# Patient Record
Sex: MALE | Race: WHITE | NOT HISPANIC OR LATINO | URBAN - METROPOLITAN AREA
[De-identification: names, ages, dates, MRNs, and addresses within clinical notes are randomized per-mention and may not be internally consistent; named-entity substitution may affect disease eponyms.]

---

## 2020-07-23 ENCOUNTER — EMERGENCY (EMERGENCY)
Facility: HOSPITAL | Age: 60
LOS: 0 days | Discharge: HOME | End: 2020-07-23
Attending: STUDENT IN AN ORGANIZED HEALTH CARE EDUCATION/TRAINING PROGRAM | Admitting: STUDENT IN AN ORGANIZED HEALTH CARE EDUCATION/TRAINING PROGRAM
Payer: SELF-PAY

## 2020-07-23 VITALS
SYSTOLIC BLOOD PRESSURE: 155 MMHG | TEMPERATURE: 98 F | HEART RATE: 94 BPM | DIASTOLIC BLOOD PRESSURE: 91 MMHG | RESPIRATION RATE: 18 BRPM | OXYGEN SATURATION: 96 %

## 2020-07-23 VITALS
HEART RATE: 78 BPM | OXYGEN SATURATION: 98 % | SYSTOLIC BLOOD PRESSURE: 127 MMHG | DIASTOLIC BLOOD PRESSURE: 62 MMHG | RESPIRATION RATE: 20 BRPM | TEMPERATURE: 97 F

## 2020-07-23 DIAGNOSIS — M79.643 PAIN IN UNSPECIFIED HAND: ICD-10-CM

## 2020-07-23 DIAGNOSIS — I63.9 CEREBRAL INFARCTION, UNSPECIFIED: ICD-10-CM

## 2020-07-23 DIAGNOSIS — F17.200 NICOTINE DEPENDENCE, UNSPECIFIED, UNCOMPLICATED: ICD-10-CM

## 2020-07-23 LAB
ALBUMIN SERPL ELPH-MCNC: 4.4 G/DL — SIGNIFICANT CHANGE UP (ref 3.5–5.2)
ALP SERPL-CCNC: 101 U/L — SIGNIFICANT CHANGE UP (ref 30–115)
ALT FLD-CCNC: 59 U/L — HIGH (ref 0–41)
ANION GAP SERPL CALC-SCNC: 20 MMOL/L — HIGH (ref 7–14)
AST SERPL-CCNC: 50 U/L — HIGH (ref 0–41)
BILIRUB SERPL-MCNC: 0.3 MG/DL — SIGNIFICANT CHANGE UP (ref 0.2–1.2)
BUN SERPL-MCNC: 13 MG/DL — SIGNIFICANT CHANGE UP (ref 10–20)
CALCIUM SERPL-MCNC: 9.6 MG/DL — SIGNIFICANT CHANGE UP (ref 8.5–10.1)
CHLORIDE SERPL-SCNC: 98 MMOL/L — SIGNIFICANT CHANGE UP (ref 98–110)
CO2 SERPL-SCNC: 20 MMOL/L — SIGNIFICANT CHANGE UP (ref 17–32)
CREAT SERPL-MCNC: 0.8 MG/DL — SIGNIFICANT CHANGE UP (ref 0.7–1.5)
GLUCOSE SERPL-MCNC: 395 MG/DL — HIGH (ref 70–99)
HCT VFR BLD CALC: 42.2 % — SIGNIFICANT CHANGE UP (ref 42–52)
HGB BLD-MCNC: 14.6 G/DL — SIGNIFICANT CHANGE UP (ref 14–18)
MCHC RBC-ENTMCNC: 30.2 PG — SIGNIFICANT CHANGE UP (ref 27–31)
MCHC RBC-ENTMCNC: 34.6 G/DL — SIGNIFICANT CHANGE UP (ref 32–37)
MCV RBC AUTO: 87.4 FL — SIGNIFICANT CHANGE UP (ref 80–94)
NRBC # BLD: 0 /100 WBCS — SIGNIFICANT CHANGE UP (ref 0–0)
PLATELET # BLD AUTO: 179 K/UL — SIGNIFICANT CHANGE UP (ref 130–400)
POTASSIUM SERPL-MCNC: 4 MMOL/L — SIGNIFICANT CHANGE UP (ref 3.5–5)
POTASSIUM SERPL-SCNC: 4 MMOL/L — SIGNIFICANT CHANGE UP (ref 3.5–5)
PROT SERPL-MCNC: 7 G/DL — SIGNIFICANT CHANGE UP (ref 6–8)
RBC # BLD: 4.83 M/UL — SIGNIFICANT CHANGE UP (ref 4.7–6.1)
RBC # FLD: 11.6 % — SIGNIFICANT CHANGE UP (ref 11.5–14.5)
SODIUM SERPL-SCNC: 138 MMOL/L — SIGNIFICANT CHANGE UP (ref 135–146)
WBC # BLD: 4.99 K/UL — SIGNIFICANT CHANGE UP (ref 4.8–10.8)
WBC # FLD AUTO: 4.99 K/UL — SIGNIFICANT CHANGE UP (ref 4.8–10.8)

## 2020-07-23 PROCEDURE — 73130 X-RAY EXAM OF HAND: CPT | Mod: 26,RT

## 2020-07-23 PROCEDURE — 70450 CT HEAD/BRAIN W/O DYE: CPT | Mod: 26

## 2020-07-23 PROCEDURE — 99285 EMERGENCY DEPT VISIT HI MDM: CPT

## 2020-07-23 PROCEDURE — 99282 EMERGENCY DEPT VISIT SF MDM: CPT

## 2020-07-23 PROCEDURE — 93010 ELECTROCARDIOGRAM REPORT: CPT

## 2020-07-23 PROCEDURE — 73110 X-RAY EXAM OF WRIST: CPT | Mod: 26,RT

## 2020-07-23 NOTE — ED PROVIDER NOTE - PROGRESS NOTE DETAILS
spoke to Dr. Donato neurology, will call [facility] to see pt and discuss with the stroke team pt seen by neurology, no stroke unit, admit tele Discussed plan for admission with patient. Patient refusing admission, stating he would rather go home. Explained to patient the urgent nature of his disease and that he may deteriorate further, stroke symptoms may worsen or he may die without further intervention or treatment. Patient voiced understanding, but states he would rather go home because he is more comfortable there. The patient wishes to leave against medical advice.  I have discussed the risks, benefits and alternatives (including the possibility of worsening of disease, pain, permanent disability, and/or death) with the patient and his/her family (if available).  The patient voices understanding of these risks, benefits, and alternatives and still wishes to sign out against medical advice.  The patient is awake, alert, oriented  x 3 and has demonstrated capacity to refuse/direct care.  I have advised the patient that they can and should return immediately should they develop any worse/different/additional symptoms, or if they change their mind and want to continue their care.

## 2020-07-23 NOTE — CONSULT NOTE ADULT - ASSESSMENT
13. Impression:   59 year old gentleman male with a PMH of DM and alcohol dependence presented to the ER with 4 days of weakness.  CT head revealed new age-indeterminate lacunar infarct involving the left corona.    14. Probable cause/s of Stroke:  Unknown, more likely related to  but will have a better understanding post MRI brain.     15. Suggestions:   Routine stroke workup including:  MRI brain without esther  MRA head and neck  TTE  LDL A1C  Telemetry monitoring   PT OT Rehab     16. Disposition:  Telemetry floor    Elian Ortega NP  [phone]

## 2020-07-23 NOTE — ED PROVIDER NOTE - ENMT, MLM
Head NC/AT, neck supple, Airway patent, Nasal mucosa clear. Mouth with normal mucosa. Throat has no vesicles, no oropharyngeal exudates and uvula is midline.

## 2020-07-23 NOTE — ED PROVIDER NOTE - NEUROLOGICAL, MLM
Alert and oriented, no focal deficits, no sensory deficits. (+) median nerve motor deficit Right hand.  DTR's 2+ bilaterally.  Gait normal.

## 2020-07-23 NOTE — ED PROVIDER NOTE - NSFOLLOWUPINSTRUCTIONS_ED_ALL_ED_FT
Ischemic Stroke     An ischemic stroke (cerebrovascular accident, or CVA) is the sudden death of brain tissue that occurs when an area of the brain does not get enough oxygen. It is a medical emergency that must be treated right away. An ischemic stroke can cause permanent loss of brain function. This can cause problems with how different parts of your body function.  What are the causes?  This condition is caused by a decrease of oxygen supply to an area of the brain, which may be the result of:  A small blood clot (embolus) or a buildup of plaque in the blood vessels (atherosclerosis) that blocks blood flow in the brain.An abnormal heart rhythm (atrial fibrillation).A blocked or damaged artery in the head or neck.Sometimes the cause of stroke is not known (cryptogenic).  What increases the risk?  Certain factors may make you more likely to develop this condition. Some of these factors are things that you can change, such as:  Obesity.Smoking cigarettes.Taking oral birth control, especially if you also use tobacco.Physical inactivity.Excessive alcohol use.Use of illegal drugs, especially cocaine and methamphetamine.Other risk factors include:  High blood pressure (hypertension).High cholesterol.Diabetes mellitus.Heart disease.Being , , , or .Being over age 60.Family history of stroke.Previous history of blood clots, stroke, or transient ischemic attack (TIA).Sickle cell disease.Being a woman with a history of preeclampsia.Migraine headache.Sleep apnea.Irregular heartbeats, such as atrial fibrillation.Chronic inflammatory diseases, such as rheumatoid arthritis or lupus.Blood clotting disorders (hypercoagulable state).What are the signs or symptoms?  Symptoms of this condition usually develop suddenly, or you may notice them after waking up from sleep. Symptoms may include sudden:  Weakness or numbness in your face, arm, or leg, especially on one side of your body.Trouble walking or difficulty moving your arms or legs.Loss of balance or coordination.Confusion.Slurred speech (dysarthria).Trouble speaking, understanding speech, or both (aphasia).Vision changes—such as double vision, blurred vision, or loss of vision—in one or both eyes.Dizziness.Nausea and vomiting.Severe headache with no known cause. The headache is often described as the worst headache ever experienced.If possible, make note of the exact time that you last felt like your normal self and what time your symptoms started. Tell your health care provider.  If symptoms come and go, this could be a sign of a warning stroke, or TIA. Get help right away, even if you feel better.  How is this diagnosed?  This condition may be diagnosed based on:  Your symptoms, your medical history, and a physical exam.CT scan of the brain.MRI.CT angiogram. This test uses a computer to take X-rays of your arteries. A dye may be injected into your blood to show the inside of your blood vessels more clearly.MRI angiogram. This is a type of MRI that is used to evaluate the blood vessels.Cerebral angiogram. This test uses X-rays and a dye to show the blood vessels in the brain and neck.You may need to see a health care provider who specializes in stroke care. A stroke specialist can be seen in person or through communication using telephone or television technology (telemedicine).  Other tests may also be done to find the cause of the stroke, such as:  Electrocardiogram (ECG).Continuous heart monitoring.Echocardiogram.Transesophageal echocardiogram (ELLY).Carotid ultrasound.A scan of the brain circulation.Blood tests.Sleep study to check for sleep apnea.How is this treated?  Treatment for this condition will depend on the duration, severity, and cause of your symptoms and on the area of the brain affected. It is very important to get treatment at the first sign of stroke symptoms. Some treatments work better if they are done within 3–6 hours of the onset of stroke symptoms. These initial treatments may include:  Aspirin.Medicines to control blood pressure.Medicine given by injection to dissolve the blood clot (thrombolytic).Treatments given directly to the affected artery to remove or dissolve the blood clot.Other treatment options may include:  Oxygen.IV fluids.Medicines to thin the blood (anticoagulants or antiplatelets).Procedures to increase blood flow.Medicines and changes to your diet may be used to help treat and manage risk factors for stroke, such as diabetes, high cholesterol, and high blood pressure.  After a stroke, you may work with physical, speech, mental health, or occupational therapists to help you recover.  Follow these instructions at home:  Medicines     Take over-the-counter and prescription medicines only as told by your health care provider.If you were told to take a medicine to thin your blood, such as aspirin or an anticoagulant, take it exactly as told by your health care provider.  Taking too much blood-thinning medicine can cause bleeding.If you do not take enough blood-thinning medicine, you will not have the protection that you need against another stroke and other problems.Understand the side effects of taking anticoagulant medicine. When taking this type of medicine, make sure you:  Hold pressure over any cuts for longer than usual.Tell your dentist and other health care providers that you are taking anticoagulants before you have any procedures that may cause bleeding.Avoid activities that may cause trauma or injury.Eating and drinking     Follow instructions from your health care provider about diet.Eat healthy foods.If your ability to swallow was affected by the stroke, you may need to take steps to avoid choking, such as:  Taking small bites when eating.Eating foods that are soft or pureed.Safety     Follow instructions from your health care team about physical activity.Use a walker or cane as told by your health care provider.Take steps to create a safe home environment in order to reduce the risk of falls. This may include:  Having your home looked at by specialists.Installing grab bars in the bedroom and bathroom.Using safety equipment, such as raised toilets and a seat in the shower.General instructions     Do not use any tobacco products, such as cigarettes, chewing tobacco, and e-cigarettes. If you need help quitting, ask your health care provider.Limit alcohol intake to no more than 1 drink a day for nonpregnant women and 2 drinks a day for men. One drink equals 12 oz of beer, 5 oz of wine, or 1½ oz of hard liquor.If you need help to stop using drugs or alcohol, ask your health care provider about a referral to a program or specialist.Maintain an active and healthy lifestyle. Get regular exercise as told by your health care provider.Keep all follow-up visits as told by your health care provider, including visits with all specialists on your health care team. This is important.How is this prevented?  Your risk of another stroke can be decreased by managing high blood pressure, high cholesterol, diabetes, heart disease, sleep apnea, and obesity. It can also be decreased by quitting smoking, limiting alcohol, and staying physically active.  Your health care provider will continue to work with you on measures to prevent short-term and long-term complications of stroke.  Get help right away if:     You have any symptoms of a stroke. "BE FAST" is an easy way to remember the main warning signs of a stroke:  B - Balance. Signs are dizziness, sudden trouble walking, or loss of balance.E - Eyes. Signs are trouble seeing or a sudden change in vision.F - Face. Signs are sudden weakness or numbness of the face, or the face or eyelid drooping on one side.A - Arms. Signs are weakness or numbness in an arm. This happens suddenly and usually on one side of the body.S - Speech. Signs are sudden trouble speaking, slurred speech, or trouble understanding what people say.T - Time. Time to call emergency services. Write down what time symptoms started.You have other signs of a stroke, such as:  A sudden, severe headache with no known cause.Nausea or vomiting.Seizure.These symptoms may represent a serious problem that is an emergency. Do not wait to see if the symptoms will go away. Get medical help right away. Call your local emergency services (911 in the [place] Do not drive yourself to the hospital. Summary  An ischemic stroke (cerebrovascular accident, or CVA) is the sudden death of brain tissue that occurs when an area of the brain does not get enough oxygen.Symptoms of this condition usually develop suddenly, or you may notice them after waking up from sleep.It is very important to get treatment at the first sign of stroke symptoms. Stroke is a medical emergency that must be treated right away.This information is not intended to replace advice given to you by your health care provider. Make sure you discuss any questions you have with your health care provider.    Document Released: 12/18/2006 Document Revised: 09/06/2019 Document Reviewed: 03/15/2017  [employer] Patient Education © 2020 [employer]

## 2020-07-23 NOTE — CONSULT NOTE ADULT - SUBJECTIVE AND OBJECTIVE BOX
Stroke Progress Note:    NIRAJ ORTEZ    1. Chief Complaint: Right hand weakness    HPI:   59 year old gentleman male with a PMH of DM and alcohol dependence presented to the ER with 4 days of weakness.    2. Relevant PMH:   Prior ischemic stroke/TIA[ ], Afib [ ], CAD [ ], HTN [ ], DLD [ ], DM [ ], PVD [ ], Obesity [ ],   Sedentary lifestyle [ ], CHF [ ], CORRINA [ ], Cancer Hx [ ].    3. Social History: Smoking [ ], Drug Use [ ], Alcohol Use [ ], Other [ ]    4. Possible Location of Stroke:  see below  5. Relevant Brain Tissue Imaging:  < from: CT Head No Cont (07.23.20 @ 12:50) >  IMPRESSION:  New age-indeterminate lacunar infarct involving the left corona radiatasince MRI and CT of the brain of 2010.    Chronic lacunar infarct involving the left thalamus, previously seen on MRI of the brain of 5/9/2010.    No evidence of intracranial hemorrhage, mass effect or midline shift.      < end of copied text >    6. Relevant Cerebrovascular Imaging:          pending      7. Relevant blood tests:     pending  8. Relevant cardiac rhythm monitoring:   pending  9. Relevant Cardiac Structure: (TTE/ELLY +/-):[ ]No intracardiac thrombus/[ ] no vegetation/[ ]no [place]ia/EF:   pending  Home Medications:      MEDICATIONS  (STANDING):      10. PT/OT/Speech/Rehab/S&Sw/ Cognitive eval results and recommendations:   pending  11. Exam:    Vital Signs Last 24 Hrs  T(C): 36.1 (23 Jul 2020 11:10), Max: 36.1 (23 Jul 2020 11:10)  T(F): 97 (23 Jul 2020 11:10), Max: 97 (23 Jul 2020 11:10)  HR: 78 (23 Jul 2020 11:10) (78 - 78)  BP: 127/62 (23 Jul 2020 11:10) (127/62 - 127/62)  BP(mean): --  RR: 20 (23 Jul 2020 11:10) (20 - 20)  SpO2: 98% (23 Jul 2020 11:10) (98% - 98%)    12.   NIH STROKE SCALE  Item	                                                        Score  1 a.	Level of Consciousness	               	0  1 b. LOC Questions	                                0  1 c.	LOC Commands	                               	0  2.	Best Gaze	                                        0  3.	Visual	                                                0  4.	Facial Palsy	                                        0  5 a.	Motor Arm - Left	                                0  5 b.	Motor Arm - Right	                        1  6 a.	Motor Leg - Left	                                0  6 b.	Motor Leg - Right	                                0  7.	Limb Ataxia	                                        0  8.	Sensory	                                                0  9.	Language	                                        0  10.	Dysarthria	                                        0  11.	Extinction and Inattention  	        0  ______________________________________  TOTAL	                                                     1          NIHSS today: 1    mRS:1  0 No symptoms at all  1 No significant disability despite symptoms; able to carry out all usual duties and activities without assistance  2 Slight disability; unable to carry out all previous activities, but able to look after own affairs  3 Moderate disability; requiring some help, but able to walk without assistance  4 Moderately severe disability; unable to walk without assistance and unable to attend to own bodily needs without assistance  5 Severe disability; bedridden, incontinent and requiring constant nursing care and attention  6 Dead

## 2020-07-23 NOTE — ED PROVIDER NOTE - ATTENDING CONTRIBUTION TO CARE
60 yo M pmh chronic etoh use pw hand weakness. R hand weakness x4 days. Difficulty gripping objects. No trauma, no inciting incident, no fever/chills, no n/v, no vision change, no hearing change, no back pain, no cp, no jaw pain, no sob, no abd pain.     CONSTITUTIONAL: Well-developed; well-nourished; in no acute distress. Sitting up and providing appropriate history and physical examination  SKIN: skin exam is warm and dry, no acute rash.  HEAD: Normocephalic; atraumatic.  EYES: PERRL, 3 mm bilateral, no nystagmus, EOM intact; conjunctiva and sclera clear.  ENT: No nasal discharge; airway clear.  NECK: Supple; non tender. + full passive ROM in all directions. No JVD  CARD: S1, S2 normal; no murmurs, gallops, or rubs. Regular rate and rhythm. + Symmetric Strong Pulses  RESP: No wheezes, rales or rhonchi. Good air movement bilaterally  ABD: soft; non-distended; non-tender. No Rebound, No Guarding, No signs of peritonitis, No CVA tenderness. No pulsatile abdominal mass. + Strong and Symmetric Pulses  EXT: Normal ROM. No clubbing, cyanosis or edema. Dp and Pt Pulses intact. Cap refill less than 3 seconds  NEURO: +R hand pronator drift. CN 2-12 intact, normal finger to nose, stable gait, no sensory or motor deficits, Alert, oriented, grossly unremarkable. GCS 15.   PSYCH: Cooperative, appropriate.

## 2020-07-23 NOTE — ED PROVIDER NOTE - CLINICAL SUMMARY MEDICAL DECISION MAKING FREE TEXT BOX
I personally evaluated the patient. I reviewed the Resident’s or Physician Assistant’s note (as assigned above), and agree with the findings and plan except as documented in my note. Patient evaluated for R hand weakness. CT, labs, EKG performed. Neurology consulted. Offered patient admission for further workup and treatment, but patient refused. The patient wishes to leave against medical advice. I have discussed the risks, benefits and alternatives (including the possibility of worsening of disease, pain, permanent disability, and/or death) with the patient and his/her family (if available).  The patient voices understanding of these risks, benefits, and alternatives and still wishes to sign out against medical advice.  The patient is awake, alert, oriented  x 3 and has demonstrated capacity to refuse/direct care.  I have advised the patient that they can and should return immediately should they develop any worse/different/additional symptoms, or if they change their mind and want to continue their care.

## 2020-07-23 NOTE — ED ADULT TRIAGE NOTE - CHIEF COMPLAINT QUOTE
c/o right hand weakness since sunday. +movement in all extremities c/o right hand weakness since Sunday. Patient feels his fingers are stiff. States he can not hold a spoon in his hand since Sunday. +movement in all extremities

## 2020-07-23 NOTE — ED PROVIDER NOTE - OBJECTIVE STATEMENT
59 y.o. male with a PMH of DM and alcohol dependence presented to the ER with 4 day h/o Right hand swelling and weakness.  Pt denies headache, ataxia, fall, visual disturbance, ataxia, other weakness/paresthesias, chest pain, dizziness, SOB.  No other complaints.

## 2020-07-23 NOTE — ED ADULT NURSE NOTE - NSIMPLEMENTINTERV_GEN_ALL_ED
Implemented All Universal Safety Interventions:  [place] to call system. Call bell, personal items and telephone within reach. Instruct patient to call for assistance. Room bathroom lighting operational. Non-slip footwear when patient is off stretcher. Physically safe environment: no spills, clutter or unnecessary equipment. Stretcher in lowest position, wheels locked, appropriate side rails in place.

## 2020-07-23 NOTE — ED PROVIDER NOTE - PATIENT PORTAL LINK FT
You can access the FollowMyHealth Patient Portal offered by [facility] by registering at the following website: [email or web address]. By joining [facility]’s FollowMyHealth portal, you will also be able to view your health information using other applications (apps) compatible with our system.

## 2020-07-23 NOTE — ED ADULT NURSE NOTE - CHIEF COMPLAINT QUOTE
c/o right hand weakness since Sunday. Patient feels his fingers are stiff. States he can not hold a spoon in his hand since Sunday. +movement in all extremities

## 2020-07-23 NOTE — ED PROVIDER NOTE - NSFOLLOWUPCLINICS_GEN_ALL_ED_FT
[facility]  Neurology  [address] [place] [address], [facility] [address]  [place] [address]  Phone: [phone]  Fax:   Follow Up Time:

## 2023-06-01 ENCOUNTER — INPATIENT (INPATIENT)
Facility: HOSPITAL | Age: 63
LOS: 0 days | Discharge: LEFT BEFORE DISCHARGE | DRG: 48 | End: 2023-06-02
Attending: INTERNAL MEDICINE | Admitting: INTERNAL MEDICINE
Payer: MEDICAID

## 2023-06-01 VITALS
WEIGHT: 139.99 LBS | RESPIRATION RATE: 20 BRPM | SYSTOLIC BLOOD PRESSURE: 171 MMHG | OXYGEN SATURATION: 100 % | TEMPERATURE: 99 F | DIASTOLIC BLOOD PRESSURE: 92 MMHG | HEART RATE: 96 BPM

## 2023-06-01 DIAGNOSIS — R27.0 ATAXIA, UNSPECIFIED: ICD-10-CM

## 2023-06-01 LAB
A1C WITH ESTIMATED AVERAGE GLUCOSE RESULT: 11.6 % — HIGH (ref 4–5.6)
ALBUMIN SERPL ELPH-MCNC: 3.7 G/DL — SIGNIFICANT CHANGE UP (ref 3.5–5.2)
ALP SERPL-CCNC: 89 U/L — SIGNIFICANT CHANGE UP (ref 30–115)
ALT FLD-CCNC: 23 U/L — SIGNIFICANT CHANGE UP (ref 0–41)
ANION GAP SERPL CALC-SCNC: 13 MMOL/L — SIGNIFICANT CHANGE UP (ref 7–14)
AST SERPL-CCNC: 35 U/L — SIGNIFICANT CHANGE UP (ref 0–41)
BASOPHILS # BLD AUTO: 0.04 K/UL — SIGNIFICANT CHANGE UP (ref 0–0.2)
BASOPHILS NFR BLD AUTO: 0.6 % — SIGNIFICANT CHANGE UP (ref 0–1)
BILIRUB SERPL-MCNC: 0.7 MG/DL — SIGNIFICANT CHANGE UP (ref 0.2–1.2)
BUN SERPL-MCNC: 12 MG/DL — SIGNIFICANT CHANGE UP (ref 10–20)
CALCIUM SERPL-MCNC: 9.1 MG/DL — SIGNIFICANT CHANGE UP (ref 8.4–10.5)
CHLORIDE SERPL-SCNC: 94 MMOL/L — LOW (ref 98–110)
CHOLEST SERPL-MCNC: 154 MG/DL — SIGNIFICANT CHANGE UP
CO2 SERPL-SCNC: 24 MMOL/L — SIGNIFICANT CHANGE UP (ref 17–32)
CREAT SERPL-MCNC: 0.9 MG/DL — SIGNIFICANT CHANGE UP (ref 0.7–1.5)
EGFR: 97 ML/MIN/1.73M2 — SIGNIFICANT CHANGE UP
EOSINOPHIL # BLD AUTO: 0.16 K/UL — SIGNIFICANT CHANGE UP (ref 0–0.7)
EOSINOPHIL NFR BLD AUTO: 2.2 % — SIGNIFICANT CHANGE UP (ref 0–8)
ESTIMATED AVERAGE GLUCOSE: 286 MG/DL — HIGH (ref 68–114)
GLUCOSE BLDC GLUCOMTR-MCNC: 264 MG/DL — HIGH (ref 70–99)
GLUCOSE BLDC GLUCOMTR-MCNC: 371 MG/DL — HIGH (ref 70–99)
GLUCOSE SERPL-MCNC: 253 MG/DL — HIGH (ref 70–99)
HCT VFR BLD CALC: 39 % — LOW (ref 42–52)
HCT VFR BLD CALC: 41.2 % — LOW (ref 42–52)
HDLC SERPL-MCNC: 40 MG/DL — LOW
HGB BLD-MCNC: 13.4 G/DL — LOW (ref 14–18)
HGB BLD-MCNC: 14.3 G/DL — SIGNIFICANT CHANGE UP (ref 14–18)
IMM GRANULOCYTES NFR BLD AUTO: 0.3 % — SIGNIFICANT CHANGE UP (ref 0.1–0.3)
LIPID PNL WITH DIRECT LDL SERPL: 84 MG/DL — SIGNIFICANT CHANGE UP
LYMPHOCYTES # BLD AUTO: 1.66 K/UL — SIGNIFICANT CHANGE UP (ref 1.2–3.4)
LYMPHOCYTES # BLD AUTO: 23.2 % — SIGNIFICANT CHANGE UP (ref 20.5–51.1)
MAGNESIUM SERPL-MCNC: 2.3 MG/DL — SIGNIFICANT CHANGE UP (ref 1.8–2.4)
MCHC RBC-ENTMCNC: 30.1 PG — SIGNIFICANT CHANGE UP (ref 27–31)
MCHC RBC-ENTMCNC: 30.6 PG — SIGNIFICANT CHANGE UP (ref 27–31)
MCHC RBC-ENTMCNC: 34.4 G/DL — SIGNIFICANT CHANGE UP (ref 32–37)
MCHC RBC-ENTMCNC: 34.7 G/DL — SIGNIFICANT CHANGE UP (ref 32–37)
MCV RBC AUTO: 87.6 FL — SIGNIFICANT CHANGE UP (ref 80–94)
MCV RBC AUTO: 88.2 FL — SIGNIFICANT CHANGE UP (ref 80–94)
MONOCYTES # BLD AUTO: 0.47 K/UL — SIGNIFICANT CHANGE UP (ref 0.1–0.6)
MONOCYTES NFR BLD AUTO: 6.6 % — SIGNIFICANT CHANGE UP (ref 1.7–9.3)
NEUTROPHILS # BLD AUTO: 4.81 K/UL — SIGNIFICANT CHANGE UP (ref 1.4–6.5)
NEUTROPHILS NFR BLD AUTO: 67.1 % — SIGNIFICANT CHANGE UP (ref 42.2–75.2)
NON HDL CHOLESTEROL: 114 MG/DL — SIGNIFICANT CHANGE UP
NRBC # BLD: 0 /100 WBCS — SIGNIFICANT CHANGE UP (ref 0–0)
NRBC # BLD: 0 /100 WBCS — SIGNIFICANT CHANGE UP (ref 0–0)
PLATELET # BLD AUTO: 200 K/UL — SIGNIFICANT CHANGE UP (ref 130–400)
PLATELET # BLD AUTO: 210 K/UL — SIGNIFICANT CHANGE UP (ref 130–400)
PMV BLD: 10.1 FL — SIGNIFICANT CHANGE UP (ref 7.4–10.4)
PMV BLD: 10.5 FL — HIGH (ref 7.4–10.4)
POTASSIUM SERPL-MCNC: 4.5 MMOL/L — SIGNIFICANT CHANGE UP (ref 3.5–5)
POTASSIUM SERPL-SCNC: 4.5 MMOL/L — SIGNIFICANT CHANGE UP (ref 3.5–5)
PROT SERPL-MCNC: 6.8 G/DL — SIGNIFICANT CHANGE UP (ref 6–8)
RBC # BLD: 4.45 M/UL — LOW (ref 4.7–6.1)
RBC # BLD: 4.67 M/UL — LOW (ref 4.7–6.1)
RBC # FLD: 11.5 % — SIGNIFICANT CHANGE UP (ref 11.5–14.5)
RBC # FLD: 11.8 % — SIGNIFICANT CHANGE UP (ref 11.5–14.5)
SODIUM SERPL-SCNC: 131 MMOL/L — LOW (ref 135–146)
TRIGL SERPL-MCNC: 153 MG/DL — HIGH
TROPONIN T SERPL-MCNC: 0.01 NG/ML — SIGNIFICANT CHANGE UP
TROPONIN T SERPL-MCNC: 0.02 NG/ML — HIGH
WBC # BLD: 6.58 K/UL — SIGNIFICANT CHANGE UP (ref 4.8–10.8)
WBC # BLD: 7.16 K/UL — SIGNIFICANT CHANGE UP (ref 4.8–10.8)
WBC # FLD AUTO: 6.58 K/UL — SIGNIFICANT CHANGE UP (ref 4.8–10.8)
WBC # FLD AUTO: 7.16 K/UL — SIGNIFICANT CHANGE UP (ref 4.8–10.8)

## 2023-06-01 PROCEDURE — 82962 GLUCOSE BLOOD TEST: CPT

## 2023-06-01 PROCEDURE — 80061 LIPID PANEL: CPT

## 2023-06-01 PROCEDURE — 99223 1ST HOSP IP/OBS HIGH 75: CPT

## 2023-06-01 PROCEDURE — 82746 ASSAY OF FOLIC ACID SERUM: CPT

## 2023-06-01 PROCEDURE — 85027 COMPLETE CBC AUTOMATED: CPT

## 2023-06-01 PROCEDURE — 83036 HEMOGLOBIN GLYCOSYLATED A1C: CPT

## 2023-06-01 PROCEDURE — 84443 ASSAY THYROID STIM HORMONE: CPT

## 2023-06-01 PROCEDURE — 84484 ASSAY OF TROPONIN QUANT: CPT

## 2023-06-01 PROCEDURE — 82607 VITAMIN B-12: CPT

## 2023-06-01 PROCEDURE — 99285 EMERGENCY DEPT VISIT HI MDM: CPT

## 2023-06-01 PROCEDURE — 70450 CT HEAD/BRAIN W/O DYE: CPT | Mod: 26,MA

## 2023-06-01 PROCEDURE — 36415 COLL VENOUS BLD VENIPUNCTURE: CPT

## 2023-06-01 RX ORDER — ONDANSETRON 8 MG/1
4 TABLET, FILM COATED ORAL EVERY 8 HOURS
Refills: 0 | Status: DISCONTINUED | OUTPATIENT
Start: 2023-06-01 | End: 2023-06-02

## 2023-06-01 RX ORDER — THIAMINE MONONITRATE (VIT B1) 100 MG
500 TABLET ORAL ONCE
Refills: 0 | Status: COMPLETED | OUTPATIENT
Start: 2023-06-01 | End: 2023-06-01

## 2023-06-01 RX ORDER — HEPARIN SODIUM 5000 [USP'U]/ML
5000 INJECTION INTRAVENOUS; SUBCUTANEOUS EVERY 12 HOURS
Refills: 0 | Status: DISCONTINUED | OUTPATIENT
Start: 2023-06-01 | End: 2023-06-02

## 2023-06-01 RX ORDER — LANOLIN ALCOHOL/MO/W.PET/CERES
3 CREAM (GRAM) TOPICAL AT BEDTIME
Refills: 0 | Status: DISCONTINUED | OUTPATIENT
Start: 2023-06-01 | End: 2023-06-02

## 2023-06-01 RX ORDER — FOLIC ACID 0.8 MG
1 TABLET ORAL DAILY
Refills: 0 | Status: DISCONTINUED | OUTPATIENT
Start: 2023-06-01 | End: 2023-06-02

## 2023-06-01 RX ORDER — ACETAMINOPHEN 500 MG
650 TABLET ORAL EVERY 6 HOURS
Refills: 0 | Status: DISCONTINUED | OUTPATIENT
Start: 2023-06-01 | End: 2023-06-02

## 2023-06-01 RX ORDER — THIAMINE MONONITRATE (VIT B1) 100 MG
100 TABLET ORAL DAILY
Refills: 0 | Status: DISCONTINUED | OUTPATIENT
Start: 2023-06-01 | End: 2023-06-02

## 2023-06-01 RX ORDER — PREGABALIN 225 MG/1
1000 CAPSULE ORAL DAILY
Refills: 0 | Status: DISCONTINUED | OUTPATIENT
Start: 2023-06-01 | End: 2023-06-02

## 2023-06-01 RX ORDER — SODIUM CHLORIDE 9 MG/ML
1000 INJECTION, SOLUTION INTRAVENOUS ONCE
Refills: 0 | Status: COMPLETED | OUTPATIENT
Start: 2023-06-01 | End: 2023-06-01

## 2023-06-01 RX ADMIN — Medication 105 MILLIGRAM(S): at 10:24

## 2023-06-01 RX ADMIN — SODIUM CHLORIDE 1000 MILLILITER(S): 9 INJECTION, SOLUTION INTRAVENOUS at 10:22

## 2023-06-01 NOTE — CONSULT NOTE ADULT - ASSESSMENT
63y right-handed male PMH etoh dependence and diabetes presented with gait instability and dysarthria for 2 days. Neurology was consulted. Exam notable for weakness of distal LUE and LLE with symmetric absent vibration LE and reduced LT, +Babinski and ?mild rigidity of LE. Labs notbale for -371, Na 131. Denied saddle anesthesia and bowel/bladder incontinence. CTH revealed old infarct of L corona radiata and white matter disease. Most likely etiology of his weakness is neuropathy: nutritional neuropathy as well as nerve damage caused by etoh. Should also rule out stroke.    Recommendations:  - Increase thiamine from 100mg qd to 500mg TID x 3-5days  - Continue folate supplement  - f/u thiamine folate b12 and TSH  - Please obtain Ammonia and B6 level 63y right-handed male PMH etoh dependence and diabetes presented with gait instability and dysarthria for 2 days. Neurology was consulted. Exam notable for weakness of distal LUE and LLE with symmetric absent vibration LE and reduced LT, +Babinski, ataxia and ?mild rigidity of LE, +Asterixis. Labs notbale for -371, Na 131. Denied saddle anesthesia and bowel/bladder incontinence. CTH revealed old infarct of L corona radiata and white matter disease. Most likely etiology of his weakness is neuropathy: nutritional neuropathy as well as nerve damage caused by etoh. Should also rule out stroke.    Recommendations:  - Increase thiamine from 100mg qd to 500mg TID x 3-5days  - Continue folate supplement  - f/u thiamine folate b12 and TSH  - Please obtain A1C, Ammonia and B6 level 63y right-handed male PMH etoh dependence and diabetes presented with gait instability and dysarthria for 2 days. Neurology was consulted. Exam notable for weakness of distal LUE and LLE with symmetric absent vibration LE and reduced LT, +Babinski, ataxia and ?mild rigidity of LE, +Asterixis. Labs notable for -371, Na 131. Denied saddle anesthesia and bowel/bladder incontinence. CTH revealed old infarct of L corona radiata and white matter disease. With asymmetric weakness, dysarthria and mild LE spasticity should rule out stroke. He likely also has peripheral neuropathy both nutritional neuropathy as well as nerve damage caused by etoh.    Recommendations:  - MR brain noncontrast  - Please obtain thiamine level, A1C, HIV, Ammonia, B6 level, UTox  - After level is drawn, Increase thiamine from 100mg qd to 500mg TID x 3-5days  - Continue folate supplement  - f/u thiamine folate b12 and TSH   63y right-handed male PMH etoh dependence and diabetes presented with gait instability and dysarthria for 2 days. Neurology was consulted. Exam notable for weakness of distal LUE and LLE with symmetric absent vibration LE and reduced LT, +Babinski, ataxia and ?mild rigidity of LE, +Asterixis. Labs notable for -371, Na 131. Denied saddle anesthesia and bowel/bladder incontinence. CTH revealed old infarct of L corona radiata and white matter disease. With asymmetric weakness, dysarthria and mild LE spasticity should rule out stroke. He likely also has peripheral neuropathy both nutritional neuropathy as well as nerve damage caused by etoh.    Recommendations:  - MR brain noncontrast  - MR cervical noncon   - Please obtain thiamine level, A1C, HIV, Ammonia, B6 level, UTox  - After level is drawn, Increase thiamine from 100mg qd to 500mg TID x 3-5days  - Continue folate supplement  - f/u thiamine folate b12 and TSH    Plan discussed with attending

## 2023-06-01 NOTE — SBIRT NOTE ADULT - NSSBIRTALCPASSREFTXDET_GEN_A_CORE
Referral for complete assessment and level of care determination at a certified treatment facility was completed by giving the patient information for a treatment facility that meets their needs and encouraging them to call for an appointment.   Patient requested and was provided with referral information to [facility], [address] [place] [address], [phone]. A call was not made to this facility because the patient requires medical care and prefers to make his own appointment. Patient was in agreement with the plan. Recommend CATCH consult on the unit. Patient provided with phone number for telephonic SBIRT ([phone]) for future follow up.

## 2023-06-01 NOTE — H&P ADULT - NSHPLABSRESULTS_GEN_ALL_CORE
14.3   7.16  )-----------( 210      ( 01 Jun 2023 10:11 )             41.2     06-01    131<L>  |  94<L>  |  12  ----------------------------<  253<H>  4.5   |  24  |  0.9    Ca    9.1      01 Jun 2023 10:11  Mg     2.3     06-01    TPro  6.8  /  Alb  3.7  /  TBili  0.7  /  DBili  x   /  AST  35  /  ALT  23  /  AlkPhos  89  06-01              Lactate Trend    CARDIAC MARKERS ( 01 Jun 2023 10:11 )  x     / 0.02 ng/mL / x     / x     / x          CAPILLARY BLOOD GLUCOSE      POCT Blood Glucose.: 264 mg/dL (01 Jun 2023 17:31)

## 2023-06-01 NOTE — H&P ADULT - NSHPREVIEWOFSYSTEMS_GEN_ALL_CORE
CONSTITUTIONAL: No weakness, fevers or chills  EYES/ENT: No visual changes;  No vertigo or throat pain   NECK: No pain or stiffness  RESPIRATORY: No cough, wheezing, hemoptysis; No shortness of breath  CARDIOVASCULAR: No chest pain or palpitations  GASTROINTESTINAL: No abdominal or epigastric pain. No nausea, vomiting, or hematemesis; No diarrhea or constipation. No melena or hematochezia.  GENITOURINARY: No dysuria, frequency or hematuria  NEUROLOGICAL: No numbness or weakness  SKIN: No itching, rashes CONSTITUTIONAL:  no fevers or chills, weakness of b/l lower extremities  EYES/ENT: No visual changes;  No vertigo or throat pain   NECK: No pain or stiffness  RESPIRATORY: No cough, wheezing, hemoptysis; No shortness of breath  CARDIOVASCULAR: No chest pain or palpitations  GASTROINTESTINAL: No abdominal or epigastric pain. No nausea, vomiting, or hematemesis; No diarrhea or constipation. No melena or hematochezia.  GENITOURINARY: No dysuria, frequency or hematuria  NEUROLOGICAL: Weakness of LE B/L, Sensation intact, Romerg sign negative  SKIN: No itching, rashes

## 2023-06-01 NOTE — ED ADULT NURSE NOTE - OBJECTIVE STATEMENT
Patient c/o weakness x 1 day, +drinks daily, unsteady gate x few days, pt states he has a history of substance abuse. Pt denies chest pain or discomfort. No sigsn of distress noted.

## 2023-06-01 NOTE — H&P ADULT - HISTORY OF PRESENT ILLNESS
62-year-old male history of alcohol use disorder and diabetes on, noncompliant with his insulin presents with ataxia times few days.  Patient admits to drinking alcohol, beer, daily admits last drink was this morning.  For the last few days has been feeling off balance.  Lives at home with a roommate.  Denies any chest pain shortness of breath difficulty breathing nausea vomiting diarrhea.  Patient has had a witnessed fall a few days ago.  Admits does not eat very much, normally just drinks alcohol. 62-year-old male history of alcohol use disorder and diabetes on insulin many years ago, non compliant  presents with ataxia for the past few days. He states that it started slowly and is worsening with time. He has to hold on something to help him walk. He denies any recent trauma and illness  Patient admits to drinking beer but no hard liquor His last drink was this morning.  For the last few days has been feeling off balance.  Lives at home with a roommate.    Denies any chest pain shortness of breath difficulty breathing nausea vomiting diarrhea.  Patient has had a witnessed fall a few days ago.  Admits does not eat very much, normally just drinks alcohol.

## 2023-06-01 NOTE — H&P ADULT - NSHPPHYSICALEXAM_GEN_ALL_CORE
GENERAL: NAD, lying in bed comfortably  HEAD:  Atraumatic, Normocephalic  EYES: conjunctiva and sclera clear  ENT: Moist mucous membranes  NECK: Supple, No JVD  CHEST/LUNG: Clear to auscultation bilaterally; No rales, rhonchi, wheezing, or rubs. Unlabored respirations  HEART: Regular rate and rhythm; No murmurs, rubs, or gallops  ABDOMEN: Soft, nontender, nondistended  EXTREMITIES:  No clubbing, cyanosis, or edema  NERVOUS SYSTEM:  A&Ox3 GENERAL: NAD, lying in bed comfortably  HEAD:  Atraumatic, Normocephalic  EYES: conjunctiva and sclera clear  ENT: Moist mucous membranes  NECK: Supple, No JVD  CHEST/LUNG: Clear to auscultation bilaterally; No rales, rhonchi, wheezing, or rubs. Unlabored respirations  HEART: Regular rate and rhythm; No murmurs, rubs, or gallops  ABDOMEN: Soft, nontender, nondistended  EXTREMITIES:  No clubbing, cyanosis, or edema  NERVOUS SYSTEM:  A&Ox3, weakness of b/l LE, Romberg sign negative

## 2023-06-01 NOTE — ED ADULT NURSE NOTE - NSFALLHARMRISKINTERV_ED_ALL_ED

## 2023-06-01 NOTE — ED PROVIDER NOTE - OBJECTIVE STATEMENT
62-year-old male history of alcohol use disorder and diabetes on, noncompliant with his insulin presents with ataxia times few days.  Patient admits to drinking alcohol, beer, daily admits last drink was this morning.  For the last few days has been feeling off balance.  Lives at home with a roommate.  Denies any chest pain shortness of breath difficulty breathing nausea vomiting diarrhea.  Patient has had a witnessed fall a few days ago.  Admits does not eat very much, normally just drinks alcohol

## 2023-06-01 NOTE — ED ADULT NURSE NOTE - ISOLATION TYPE:
Ambulatory/Rehab Services H2 Model Falls Risk Assessment    Risk Factor Pts. ·   Confusion/Disorientation/Impulsivity  []    4 ·   Symptomatic Depression  []   2 ·   Altered Elimination  []   1 ·   Dizziness/Vertigo  []   1 ·   Gender (Male)  []   1 ·   Any administered antiepileptics (anticonvulsants):  []   2 ·   Any administered benzodiazepines:  []   1 ·   Visual Impairment (specify):  []   1 ·   Portable Oxygen Use  []   1 ·   Orthostatic ? BP  []   1 ·   History of Recent Falls (within 3 mos.)  [x]   5     Ability to Rise from Chair (choose one) Pts. ·   Ability to rise in a single movement  []   0 ·   Pushes up, successful in one attempt  [x]   1 ·   Multiple attempts, but successful  []   3 ·   Unable to rise without assistance  []   4   Total: (5 or greater = High Risk) 6     Falls Prevention Plan:   []                Physical Limitations to Exercise (specify):   []                Mobility Assistance Device (type):   []                Exercise/Equipment Adaptation (specify):    ©2010 [facility] [address] Patent #[id number].  Federal Law prohibits the replication, distribution or use without written permission from [facility] Invaluable
None

## 2023-06-01 NOTE — ED PROVIDER NOTE - CLINICAL SUMMARY MEDICAL DECISION MAKING FREE TEXT BOX
62-year-old male presented today with ataxia.  Patient is hemodynamically stable upon evaluation.  Patient had labs performed which were indicative of no significant electrolyte abnormalities.  Patient's troponin is elevated.  EKG was grossly unremarkable.  CT head did not show any evidence of pathology.  Patient was admitted for further evaluation and care for elevated troponin and ataxia.

## 2023-06-01 NOTE — PATIENT PROFILE ADULT - NSPROEXTENSIONSOFSELF_GEN_A_NUR
[facility],   [address] [place] [address], [place]  Phone: (   )    -  Fax: (   )    -  Follow Up Time:    none

## 2023-06-01 NOTE — PATIENT PROFILE ADULT - NSPROIMPLANTSMEDDEV_GEN_A_NUR
----- Message from Mali Saxena sent at 7/11/2020  8:10 AM CDT -----  Regarding: Return call  Contact: Patient @ [phone]  Patient is returning a phone call.  Who left a message for the patient: Joelle  Does patient know what this is regarding:    Comments: Patient need talk about RX              
Spoke to pt  Earlier see previous message   
None

## 2023-06-01 NOTE — PATIENT PROFILE ADULT - FALL HARM RISK - HARM RISK INTERVENTIONS

## 2023-06-01 NOTE — CONSULT NOTE ADULT - ATTENDING COMMENTS
Seen the patient on 6/2. Patient who presented intoxicated with alcohol and history of chronic alcohol use for which neurology consulted for ataxia. Unable to stand unassisted. Likely chronic cerebellar atrophy and peripheral neuropathy. Brain and C spine MRI to r/o acute CNS pathology. Thiamin and folate supplement.

## 2023-06-01 NOTE — CONSULT NOTE ADULT - SUBJECTIVE AND OBJECTIVE BOX
NEUROLOGY CONSULT    HPI:  62-year-old male history of alcohol use disorder and diabetes on insulin many years ago, non compliant  presents with ataxia for the past few days. He states that it started slowly and is worsening with time. He has to hold on something to help him walk. He denies any recent trauma and illness  Patient admits to drinking beer but no hard liquor His last drink was this morning.  For the last few days has been feeling off balance.  Lives at home with a roommate.    Denies any chest pain shortness of breath difficulty breathing nausea vomiting diarrhea.  Patient has had a witnessed fall a few days ago.  Admits does not eat very much, normally just drinks alcohol. (01 Jun 2023 17:51)     MEDICATIONS  Home Medications:    MEDICATIONS  (STANDING):  cyanocobalamin 1000 MICROGram(s) Oral daily  folic acid 1 milliGRAM(s) Oral daily  heparin   Injectable 5000 Unit(s) SubCutaneous every 12 hours  thiamine 100 milliGRAM(s) Oral daily    MEDICATIONS  (PRN):  acetaminophen     Tablet .. 650 milliGRAM(s) Oral every 6 hours PRN Temp greater or equal to 38C (100.4F), Mild Pain (1 - 3)  aluminum hydroxide/magnesium hydroxide/simethicone Suspension 30 milliLiter(s) Oral every 4 hours PRN Dyspepsia  LORazepam   Injectable 2 milliGRAM(s) IV Push every 1 hour PRN CIWA-Ar score 8 or greater  melatonin 3 milliGRAM(s) Oral at bedtime PRN Insomnia  ondansetron Injectable 4 milliGRAM(s) IV Push every 8 hours PRN Nausea and/or Vomiting      FAMILY HISTORY:    SOCIAL HISTORY: negative for tobacco, alcohol, or ilicit drug use.    Allergies    No Known Allergies    Intolerances        GEN: NAD, pleasant, cooperative    NEURO:   MENTAL STATUS: AAOx3  LANG/SPEECH: Fluent, intact naming, repetition & comprehension  CRANIAL NERVES:  II: Pupils equal and reactive, no RAPD, normal visual field and fundus  III, IV, VI: EOM intact, no gaze preference or deviation  V: normal  VII: no facial asymmetry  VIII: normal hearing to speech  MOTOR: 5/5 in both upper and lower extremities  REFLEXES: 2/4 throughout, bilateral flexor plantars  SENSORY: Normal to touch, temperature & pin prick in all extremiteis  COORD: Normal finger to nose and heel to shin, no tremor, no dysmetria  Gait:   NIHSS: **** ASPECT Score: ***** ICH Score: ****** (GCS)    LABS:                        14.3   7.16  )-----------( 210      ( 01 Jun 2023 10:11 )             41.2     06-01    131<L>  |  94<L>  |  12  ----------------------------<  253<H>  4.5   |  24  |  0.9    Ca    9.1      01 Jun 2023 10:11  Mg     2.3     06-01    TPro  6.8  /  Alb  3.7  /  TBili  0.7  /  DBili  x   /  AST  35  /  ALT  23  /  AlkPhos  89  06-01    Hemoglobin A1C:   Vitamin B12     CAPILLARY BLOOD GLUCOSE      POCT Blood Glucose.: 371 mg/dL (01 Jun 2023 20:45)              Microbiology:      RADIOLOGY, EKG AND ADDITIONAL TESTS: Reviewed.           NEUROLOGY CONSULT    HPI:  62-year-old male history of alcohol use disorder and diabetes on insulin many years ago, non compliant  presents with ataxia for the past few days. He states that it started slowly and is worsening with time. He has to hold on something to help him walk. He denies any recent trauma and illness  Patient admits to drinking beer but no hard liquor His last drink was this morning.  For the last few days has been feeling off balance.  Lives at home with a roommate.    Denies any chest pain shortness of breath difficulty breathing nausea vomiting diarrhea.  Patient has had a witnessed fall a few days ago.  Admits does not eat very much, normally just drinks alcohol. (01 Jun 2023 17:51)    Additional history: 63y right-handed male PMH etoh dependence and diabetes admitted for gait instability. Dysarthria and leg weakness began 2 days ago. During this time he stayed in bed for most of the time. He drinks 7 beers a day, acknowledges he has poor diet and eats minimal vegetables, drinks no water. Denied abdominal pain or distension, no recent illnesses including respiratory or rash. He notes diarrhea related to his poor appetite and predominately alcohol in his diet. Is not aware of any prior strokes.      MEDICATIONS  Home Medications:    MEDICATIONS  (STANDING):  cyanocobalamin 1000 MICROGram(s) Oral daily  folic acid 1 milliGRAM(s) Oral daily  heparin   Injectable 5000 Unit(s) SubCutaneous every 12 hours  thiamine 100 milliGRAM(s) Oral daily    MEDICATIONS  (PRN):  acetaminophen     Tablet .. 650 milliGRAM(s) Oral every 6 hours PRN Temp greater or equal to 38C (100.4F), Mild Pain (1 - 3)  aluminum hydroxide/magnesium hydroxide/simethicone Suspension 30 milliLiter(s) Oral every 4 hours PRN Dyspepsia  LORazepam   Injectable 2 milliGRAM(s) IV Push every 1 hour PRN CIWA-Ar score 8 or greater  melatonin 3 milliGRAM(s) Oral at bedtime PRN Insomnia  ondansetron Injectable 4 milliGRAM(s) IV Push every 8 hours PRN Nausea and/or Vomiting      FAMILY HISTORY:    SOCIAL HISTORY: negative for tobacco, alcohol, or ilicit drug use.    Allergies    No Known Allergies    Intolerances      NEURO EXAM  GEN: NAD, pleasant, cooperative but becomes tearful. Slight distended abdomen nontender without fluid wave  PSYCH: Good insight about situation  NEURO:   MENTAL STATUS: AAOx3  LANG/SPEECH: Mild dysarthria, intact naming, repetition & comprehension. No confabulation.  CRANIAL NERVES:  II: Pupils equal and reactive, no RAPD, normal visual field.  III, IV, VI: Full extraocular movements, no gaze preference or deviation  V: normal  VII: no facial asymmetry  VIII: normal hearing to speech  MOTOR: RUE 5/5                LUE 5/5 shoulder abduction and adduction, 4+/5 elbow flexion and extension. 4/5 wrist extension and flexion, weak handgrip and interossei               RLE 5/5 proximal and distal                LLE: 5/5 hip and knee flexion and extension. 4+/5 plantar flexion with 5/5 dorsiflexion               No drift. ?asterixis of the hands  REFLEXES: 2+ biceps and BR. Negative orosco. 2+patellar and ankles. No ankle clonus UPgoing toes b/l  SENSORY: Absent vibration sense below knees. Diminished LT on feet. Otherwise intact  COORD: Dysmetria R>L hand and Right H2S  Gait: Deferred      LABS:                        14.3   7.16  )-----------( 210      ( 01 Jun 2023 10:11 )             41.2     06-01    131<L>  |  94<L>  |  12  ----------------------------<  253<H>  4.5   |  24  |  0.9    Ca    9.1      01 Jun 2023 10:11  Mg     2.3     06-01    TPro  6.8  /  Alb  3.7  /  TBili  0.7  /  DBili  x   /  AST  35  /  ALT  23  /  AlkPhos  89  06-01    Hemoglobin A1C:   Vitamin B12     CAPILLARY BLOOD GLUCOSE      POCT Blood Glucose.: 371 mg/dL (01 Jun 2023 20:45)              Microbiology:      RADIOLOGY, EKG AND ADDITIONAL TESTS: Reviewed.    < from: CT Head No Cont (06.01.23 @ 12:04) >    The ventricles and cortical sulci are within normal limits for age. There   is no evidence of hydrocephalus.    There are stable patchy hypodensities throughout the hemispheric white   matter without mass effect compatible with chronic microvascular changes.    Stable chronic lacunar infarct in the left corona radiata.    There is no acute intracranial hemorrhage, extra-axial fluid collection   or midline shift.  Gray white matter differentiation is maintained.    Vascular calcifications are noted.    The visualized paranasal sinuses and mastoids are clear.    IMPRESSION:    No evidence of acute intracranial pathology. Stable exam since 7/23/2020.    --- End of Report ---      < end of copied text >         NEUROLOGY CONSULT    HPI:  62-year-old male history of alcohol use disorder and diabetes on insulin many years ago, non compliant  presents with ataxia for the past few days. He states that it started slowly and is worsening with time. He has to hold on something to help him walk. He denies any recent trauma and illness  Patient admits to drinking beer but no hard liquor His last drink was this morning.  For the last few days has been feeling off balance.  Lives at home with a roommate.    Denies any chest pain shortness of breath difficulty breathing nausea vomiting diarrhea.  Patient has had a witnessed fall a few days ago.  Admits does not eat very much, normally just drinks alcohol. (01 Jun 2023 17:51)    Additional history: 63y right-handed male PMH etoh dependence and diabetes admitted for gait instability. Dysarthria and leg weakness began 2 days ago. During this time he stayed in bed for most of the time. He drinks 7 beers a day, acknowledges he has poor diet and eats minimal vegetables, drinks no water. Denied abdominal pain or distension, no recent illnesses including respiratory or rash. He notes diarrhea related to his poor appetite and predominately alcohol in his diet. Is not aware of any prior strokes.      MEDICATIONS  Home Medications:    MEDICATIONS  (STANDING):  cyanocobalamin 1000 MICROGram(s) Oral daily  folic acid 1 milliGRAM(s) Oral daily  heparin   Injectable 5000 Unit(s) SubCutaneous every 12 hours  thiamine 100 milliGRAM(s) Oral daily    MEDICATIONS  (PRN):  acetaminophen     Tablet .. 650 milliGRAM(s) Oral every 6 hours PRN Temp greater or equal to 38C (100.4F), Mild Pain (1 - 3)  aluminum hydroxide/magnesium hydroxide/simethicone Suspension 30 milliLiter(s) Oral every 4 hours PRN Dyspepsia  LORazepam   Injectable 2 milliGRAM(s) IV Push every 1 hour PRN CIWA-Ar score 8 or greater  melatonin 3 milliGRAM(s) Oral at bedtime PRN Insomnia  ondansetron Injectable 4 milliGRAM(s) IV Push every 8 hours PRN Nausea and/or Vomiting      FAMILY HISTORY:    SOCIAL HISTORY: negative for tobacco, alcohol, or ilicit drug use.    Allergies    No Known Allergies    Intolerances      NEURO EXAM  GEN: NAD, pleasant, cooperative but becomes tearful. Slight distended abdomen nontender without fluid wave  PSYCH: Good insight about situation  NEURO:   MENTAL STATUS: AAOx3  LANG/SPEECH: Mild dysarthria, intact naming, repetition & comprehension. No confabulation.  CRANIAL NERVES:  II: Pupils equal and reactive, no RAPD, normal visual field.  III, IV, VI: Full extraocular movements, no gaze preference or deviation  V: normal  VII: no facial asymmetry  VIII: normal hearing to speech  MOTOR: RUE 5/5                LUE 5/5 shoulder abduction and adduction, 4+/5 elbow flexion and extension. 4/5 wrist extension and flexion, weak handgrip and interossei               RLE 5/5 proximal and distal                LLE: 5/5 hip and knee flexion and extension. 4+/5 plantar flexion with 5/5 dorsiflexion               Mild spasticity LE. No rigidity of UE. No drift. ?asterixis of the hands  REFLEXES: 2+ biceps and BR. Negative orosco. 2+patellar and ankles. No ankle clonus UPgoing toes b/l. Negative palmomental  SENSORY: Absent vibration sense below knees. Diminished LT on feet. Otherwise intact  COORD: Dysmetria R>L hand and Right H2S  Gait: Deferred      LABS:                        14.3   7.16  )-----------( 210      ( 01 Jun 2023 10:11 )             41.2     06-01    131<L>  |  94<L>  |  12  ----------------------------<  253<H>  4.5   |  24  |  0.9    Ca    9.1      01 Jun 2023 10:11  Mg     2.3     06-01    TPro  6.8  /  Alb  3.7  /  TBili  0.7  /  DBili  x   /  AST  35  /  ALT  23  /  AlkPhos  89  06-01    Hemoglobin A1C:   Vitamin B12     CAPILLARY BLOOD GLUCOSE      POCT Blood Glucose.: 371 mg/dL (01 Jun 2023 20:45)              Microbiology:      RADIOLOGY, EKG AND ADDITIONAL TESTS: Reviewed.    < from: CT Head No Cont (06.01.23 @ 12:04) >    The ventricles and cortical sulci are within normal limits for age. There   is no evidence of hydrocephalus.    There are stable patchy hypodensities throughout the hemispheric white   matter without mass effect compatible with chronic microvascular changes.    Stable chronic lacunar infarct in the left corona radiata.    There is no acute intracranial hemorrhage, extra-axial fluid collection   or midline shift.  Gray white matter differentiation is maintained.    Vascular calcifications are noted.    The visualized paranasal sinuses and mastoids are clear.    IMPRESSION:    No evidence of acute intracranial pathology. Stable exam since 7/23/2020.    --- End of Report ---      < end of copied text >

## 2023-06-01 NOTE — SBIRT NOTE ADULT - NSSBIRTBRIEFINTDET_GEN_A_CORE
Provided SBIRT services: Full screen positive. Referral to Treatment Performed.   Screening results were reviewed with the patient and patient was provided information about healthy guidelines and potential negative consequences associated with level of risk. Motivation and readiness to reduce or stop use was discussed and goals and activities to make changes were suggested/offered.

## 2023-06-01 NOTE — H&P ADULT - ATTENDING COMMENTS
62-year-old male history of alcohol use disorder and diabetes on insulin many years ago, non compliant  presents with ataxia for the past few days. He states that it started slowly and is worsening with time. He has to hold on something to help him walk. He denies any recent trauma and illness  Patient admits to drinking beer but no hard liquor His last drink was this morning.  For the last few days has been feeling off balance.  Lives at home with a roommate.     Agree  with assessment  except for changes below.     CT: No evidence of acute intracranial pathology. Stable exam since 7/23/2020.    IMPRESSION   L/E Weakness and Unsteady Gait   Hx of ETOH Abuse   Hx Uncontrolled  DM   Continue CIWA Monitoring,  folic acid , thiamine, electrolyte  repletion,, counseling on cessation, monitor electrolytes & replete PRN, CMP, mag, phos, Multi vitamin   f/u vit b12, Alc, folate levels, troponin, TSH  Fall Precautions   F/u Neurology   PT Rehab   Hold oral meds;  check fs;  Start insulin  regimen if  serum Glucose >180, Lantus/Lispro,  Hold for Hypoglycemia Goal  Gaol 140-180   Diabetic Education 62-year-old male history of alcohol use disorder and diabetes on insulin many years ago, non compliant  presents with ataxia for the past few days. He states that it started slowly and is worsening with time. He has to hold on something to help him walk. He denies any recent trauma and illness  Patient admits to drinking beer but no hard liquor His last drink was this morning.  For the last few days has been feeling off balance.  Lives at home with a roommate.     Agree  with assessment  except for changes below.     CT: No evidence of acute intracranial pathology. Stable exam since 7/23/2020.    IMPRESSION   L/E Weakness and Unsteady Gait   Hx of ETOH Abuse   Hx Uncontrolled  DM   Continue CIWA Monitoring,  folic acid , thiamine, electrolyte  repletion,, counseling on cessation, monitor electrolytes & replete PRN, CMP, mag, phos, Multi vitamin   f/u vit b12, Alc, folate levels, troponin, TSH  Fall Precautions   F/u Neurology   Further Imaging as per Neurology  PT Rehab   Hold oral meds;  check fs;  Start insulin  regimen if  serum Glucose >180, Lantus/Lispro,  Hold for Hypoglycemia Goal  Gaol 140-180   Diabetic Education Poor Historian  62-year-old male history of alcohol use disorder and diabetes on insulin many years ago, non compliant  presents with ataxia for the past few days. He states that it started slowly and is worsening with time. He has to hold on something to help him walk. He denies any recent trauma and illness  Patient admits to drinking beer but no hard liquor His last drink was this morning.  For the last few days has been feeling off balance.  Lives at home with a roommate.     Agree  with assessment  except for changes below.     VITAL SIGNS: AFebrile, vital signs stable  CONSTITUTIONAL: Well-developed; well-nourished; in no acute distress.  SKIN: Skin exam is warm and dry, no acute rash.  HEAD: Normocephalic; atraumatic.  EYES: Extraocular movements intact  ENT: No nasal discharge; airway clear. Moist mucus membranes.  NECK: Supple; non tender. No rigidity  CARD: regular rate and rhythm. Normal S1, S2; no murmurs, gallops, or rubs.  RESP: On  auscultation bilaterally. No wheezes, rales or rhonchi.  ABD: Abdomen soft; non-tender; non-distended  EXT: Normal ROM. No clubbing, cyanosis or edema.   NEURO: Alert and oriented x 2-3. No focal deficits.  PSYCH: cooperative, appropriate.     CT: No evidence of acute intracranial pathology. Stable exam since 7/23/2020.    IMPRESSION   L/E Weakness and Unsteady Gait   Hx of ETOH Abuse  Drinks 4-6 beers per day   Hx Uncontrolled  DM   Continue CIWA Monitoring,  folic acid , thiamine, electrolyte  repletion,, counseling on cessation, monitor electrolytes & replete PRN, CMP, mag, phos, Multi vitamin , Catch Team  f/u vit b12, Alc, folate levels, troponin, TSH  Fall Precautions   F/u Neurology   Further Imaging as per Neurology  PT Rehab   Hold oral meds;  check fs;  Start insulin  regimen if  serum Glucose >180, Lantus/Lispro,  Hold for Hypoglycemia Goal  Gaol 140-180   Diabetic Education    Seen on 06/01/23

## 2023-06-01 NOTE — H&P ADULT - ASSESSMENT
62-year-old male history of alcohol use disorder and diabetes on, noncompliant with his insulin presents with ataxia times few days.  Patient admits to drinking alcohol, beer, daily admits last drink was this morning.  For the last few days has been feeling off balance.  Lives at home with a roommate.  Denies any chest pain shortness of breath difficulty breathing nausea vomiting diarrhea.  Patient has had a witnessed fall a few days ago.  Admits does not eat very much, normally just drinks alcohol. 62-year-old male history of alcohol use disorder and diabetes on insulin many years ago, non compliant  presents with ataxia for the past few days. He states that it started slowly and is worsening with time. He has to hold on something to help him walk. He denies any recent trauma and illness  Patient admits to drinking beer but no hard liquor His last drink was this morning.  For the last few days has been feeling off balance.  Lives at home with a roommate.     # Weakness of b/l LE  # Alchol abuse  # Diabetic type 2( not taking insulin)  # Negative Romberg sign, Unsteady gait, no tremors,   # CT HEAD; from: CT Head No Cont (06.01.23 @ 12:04) >No evidence of acute intracranial pathology. Stable exam since 7/23/2020. New age-indeterminate lacunar infarct involving the left corona radiata since MRI and CT of the brain of 2010.Chronic lacunar infarct involving the left thalamus, previously seen on MRI of the brain of 5/9/2010.    PLAN:  - Started on CIWA-A protocol, didn't had any withdrawal before.  - started on Thiamine and folate,   - EKG NSR  - f/u vit b12, Alc, folate levels, troponin, TSH,  - Neuro consulted  - PT consulted      DIET; DASH  CODE: Full  DVT: Heparin sub q

## 2023-06-02 VITALS
SYSTOLIC BLOOD PRESSURE: 146 MMHG | TEMPERATURE: 98 F | RESPIRATION RATE: 18 BRPM | DIASTOLIC BLOOD PRESSURE: 84 MMHG | HEART RATE: 86 BPM

## 2023-06-02 LAB
FOLATE SERPL-MCNC: >20 NG/ML — SIGNIFICANT CHANGE UP
GLUCOSE BLDC GLUCOMTR-MCNC: 300 MG/DL — HIGH (ref 70–99)
GLUCOSE BLDC GLUCOMTR-MCNC: >600 MG/DL — CRITICAL HIGH (ref 70–99)
TSH SERPL-MCNC: 1.6 UIU/ML — SIGNIFICANT CHANGE UP (ref 0.27–4.2)
VIT B12 SERPL-MCNC: 849 PG/ML — SIGNIFICANT CHANGE UP (ref 232–1245)

## 2023-06-02 PROCEDURE — 99233 SBSQ HOSP IP/OBS HIGH 50: CPT

## 2023-06-02 PROCEDURE — 99221 1ST HOSP IP/OBS SF/LOW 40: CPT

## 2023-06-02 RX ORDER — THIAMINE MONONITRATE (VIT B1) 100 MG
500 TABLET ORAL EVERY 8 HOURS
Refills: 0 | Status: DISCONTINUED | OUTPATIENT
Start: 2023-06-02 | End: 2023-06-02

## 2023-06-02 RX ORDER — ATORVASTATIN CALCIUM 80 MG/1
1 TABLET, FILM COATED ORAL
Qty: 30 | Refills: 0
Start: 2023-06-02 | End: 2023-07-01

## 2023-06-02 RX ORDER — ASPIRIN/CALCIUM CARB/MAGNESIUM 324 MG
1 TABLET ORAL
Qty: 30 | Refills: 0
Start: 2023-06-02 | End: 2023-07-01

## 2023-06-02 RX ORDER — INSULIN LISPRO 100/ML
3 VIAL (ML) SUBCUTANEOUS
Refills: 0 | Status: DISCONTINUED | OUTPATIENT
Start: 2023-06-02 | End: 2023-06-02

## 2023-06-02 RX ORDER — INSULIN LISPRO 100/ML
VIAL (ML) SUBCUTANEOUS AT BEDTIME
Refills: 0 | Status: DISCONTINUED | OUTPATIENT
Start: 2023-06-02 | End: 2023-06-02

## 2023-06-02 RX ORDER — DEXTROSE 50 % IN WATER 50 %
12.5 SYRINGE (ML) INTRAVENOUS ONCE
Refills: 0 | Status: DISCONTINUED | OUTPATIENT
Start: 2023-06-02 | End: 2023-06-02

## 2023-06-02 RX ORDER — DEXTROSE 50 % IN WATER 50 %
25 SYRINGE (ML) INTRAVENOUS ONCE
Refills: 0 | Status: DISCONTINUED | OUTPATIENT
Start: 2023-06-02 | End: 2023-06-02

## 2023-06-02 RX ORDER — SODIUM CHLORIDE 9 MG/ML
1000 INJECTION, SOLUTION INTRAVENOUS
Refills: 0 | Status: DISCONTINUED | OUTPATIENT
Start: 2023-06-02 | End: 2023-06-02

## 2023-06-02 RX ORDER — PREGABALIN 225 MG/1
1 CAPSULE ORAL
Qty: 30 | Refills: 0
Start: 2023-06-02 | End: 2023-07-01

## 2023-06-02 RX ORDER — GLUCAGON INJECTION, SOLUTION 0.5 MG/.1ML
1 INJECTION, SOLUTION SUBCUTANEOUS ONCE
Refills: 0 | Status: DISCONTINUED | OUTPATIENT
Start: 2023-06-02 | End: 2023-06-02

## 2023-06-02 RX ORDER — THIAMINE MONONITRATE (VIT B1) 100 MG
1 TABLET ORAL
Qty: 10 | Refills: 0
Start: 2023-06-02 | End: 2023-06-11

## 2023-06-02 RX ORDER — INSULIN LISPRO 100/ML
5 VIAL (ML) SUBCUTANEOUS ONCE
Refills: 0 | Status: COMPLETED | OUTPATIENT
Start: 2023-06-02 | End: 2023-06-02

## 2023-06-02 RX ORDER — FOLIC ACID 0.8 MG
1 TABLET ORAL
Qty: 30 | Refills: 0
Start: 2023-06-02 | End: 2023-07-01

## 2023-06-02 RX ORDER — INSULIN GLARGINE 100 [IU]/ML
10 INJECTION, SOLUTION SUBCUTANEOUS EVERY MORNING
Refills: 0 | Status: DISCONTINUED | OUTPATIENT
Start: 2023-06-02 | End: 2023-06-02

## 2023-06-02 RX ORDER — DEXTROSE 50 % IN WATER 50 %
15 SYRINGE (ML) INTRAVENOUS ONCE
Refills: 0 | Status: DISCONTINUED | OUTPATIENT
Start: 2023-06-02 | End: 2023-06-02

## 2023-06-02 RX ADMIN — Medication 5 UNIT(S): at 08:22

## 2023-06-02 RX ADMIN — INSULIN GLARGINE 10 UNIT(S): 100 INJECTION, SOLUTION SUBCUTANEOUS at 08:27

## 2023-06-02 RX ADMIN — Medication 2 MILLIGRAM(S): at 10:11

## 2023-06-02 RX ADMIN — Medication 3 MILLIGRAM(S): at 00:20

## 2023-06-02 RX ADMIN — Medication 2 MILLIGRAM(S): at 08:59

## 2023-06-02 NOTE — DISCHARGE NOTE PROVIDER - NSDCMRMEDTOKEN_GEN_ALL_CORE_FT
aspirin 81 mg oral tablet: 1 tab(s) orally once a day  atorvastatin 40 mg oral tablet: 1 tab(s) orally once a day (at bedtime)  cyanocobalamin 1000 mcg oral tablet: 1 tab(s) orally once a day  folic acid 1 mg oral tablet: 1 tab(s) orally once a day  thiamine 100 mg oral tablet: 1 tab(s) orally once a day

## 2023-06-02 NOTE — PHYSICAL THERAPY INITIAL EVALUATION ADULT - GENERAL OBSERVATIONS, REHAB EVAL
Attempted to see pt for b/s PT, Pt encountered in the bed, declined for PT stating "I am leaving from here, I don't need PT, I can walk holding on something, I can manage." Despite of education Pt continue to decline for PT. Will f/u once pt is agreeable for PT.

## 2023-06-02 NOTE — DISCHARGE NOTE PROVIDER - NSDCCPCAREPLAN_GEN_ALL_CORE_FT
PRINCIPAL DISCHARGE DIAGNOSIS  Diagnosis: Ataxia  Assessment and Plan of Treatment: You need further workup for your imbalance, you chose to leave against medical advice.

## 2023-06-02 NOTE — PROGRESS NOTE ADULT - ASSESSMENT
62-year-old male history of alcohol use disorder and diabetes on insulin many years ago, non compliant  presents with ataxia for the past few days. He states that it started slowly and is worsening with time. He has to hold on something to help him walk. He denies any recent trauma and illness  Patient admits to drinking beer but no hard liquor His last drink was this morning.  For the last few days has been feeling off balance.  Lives at home with a roommate.     # Weakness of b/l LE  # Negative Romberg sign, Unsteady gait, no tremors,   # CT HEAD; from: CT Head No Cont (06.01.23 @ 12:04) >No evidence of acute intracranial pathology. Stable exam since 7/23/2020. New age-indeterminate lacunar infarct involving the left corona radiata since MRI and CT of the brain of 2010.Chronic lacunar infarct involving the left thalamus, previously seen on MRI of the brain of 5/9/2010  - Neuro consulted: should rule out stroke. He likely also has peripheral neuropathy both nutritional neuropathy as well as nerve damage caused by etoh.  - Neuro Recommendations: MR brain noncontrast. MR cervical noncon. Obtain thiamine level, A1C, HIV, Ammonia, B6 level, UTox. After level is drawn, Increase thiamine from 100mg qd to 500mg TID x 3-5days  - PT consult  - F/u blood work    # Alcohol abuse  - Ciwa taper  - STart Thiamine and folate  - Educated on risks of alcohol abuse    #Insulin dependant diabetes  - A1c 11%  - Not taking insulin   - Refusing to take in hospital, educated on risks      DIET; DASH  CODE: Full  DVT: Heparin sub q

## 2023-06-02 NOTE — PROGRESS NOTE ADULT - ASSESSMENT
HPI:  62-year-old male history of alcohol use disorder and diabetes on insulin many years ago, non compliant  presents with ataxia for the past few days. He states that it started slowly and is worsening with time. He has to hold on something to help him walk. He denies any recent trauma and illness  Patient admits to drinking beer but no hard liquor His last drink was this morning.  For the last few days has been feeling off balance.  Lives at home with a roommate.    Denies any chest pain shortness of breath difficulty breathing nausea vomiting diarrhea.  Patient has had a witnessed fall a few days ago.  Admits does not eat very much, normally just drinks alcohol. (01 Jun 2023 17:51)    #Ataxia secondary to peripheral neuropathy versus nutritional deficiency versus central neurologic   mri ordered   appreciate neuro eval   check thiamine  b12  folate vdrl     #Ethanol use disorder   drinks 5 beers per day   ciwa protocol with lorazepam taper     #Diabetes   POCT Blood Glucose.: 300 mg/dL (02 Jun 2023 07:30)  POCT Blood Glucose.: >600 mg/dL (02 Jun 2023 07:19)  POCT Blood Glucose.: 371 mg/dL (01 Jun 2023 20:45)  POCT Blood Glucose.: 264 mg/dL (01 Jun 2023 17:3  elevated , refusing insulin sq  , per discussion with patient , not compliant at home     #Hyponatremia secondary to hyperglycemia no intervention     #Patient wishes to leave despite workup not performed, educated patients about risk of leaving without completed treatment workup which may result in permanent disability or death , patient verbalized understanding with teach back      PROGRESS NOTE HANDOFF    Pending: MRI  , vitamin levels  , pt , ciwa taper     Family discussion: patient verbalized understanding and agreeable to plan of care     Disposition: Home

## 2023-06-02 NOTE — DISCHARGE NOTE NURSING/CASE MANAGEMENT/SOCIAL WORK - PATIENT PORTAL LINK FT
You can access the FollowMyHealth Patient Portal offered by [facility] by registering at the following website: [email or web address]. By joining [employer]’s FollowMyHealth portal, you will also be able to view your health information using other applications (apps) compatible with our system.

## 2023-06-02 NOTE — DISCHARGE NOTE PROVIDER - CARE PROVIDER_API CALL
Verena Cardona  Internal Medicine  [address] [place] [id number]  Phone: [phone]  Fax: [phone]  Follow Up Time: 1 week

## 2023-06-02 NOTE — DISCHARGE NOTE PROVIDER - HOSPITAL COURSE
62-year-old male history of alcohol use disorder and diabetes on insulin many years ago, non compliant  presents with ataxia for the past few days. He states that it started slowly and is worsening with time. He has to hold on something to help him walk. He denies any recent trauma and illness  Patient admits to drinking beer but no hard liquor His last drink was this morning.  For the last few days has been feeling off balance.  Lives at home with a roommate.     **patient wants to leave AMA, understands all the risks and dangers of doing so and still wants to proceed**    # Weakness of b/l LE  # Negative Romberg sign, Unsteady gait, no tremors,   # CT HEAD; from: CT Head No Cont (06.01.23 @ 12:04) >No evidence of acute intracranial pathology. Stable exam since 7/23/2020. New age-indeterminate lacunar infarct involving the left corona radiata since MRI and CT of the brain of 2010.Chronic lacunar infarct involving the left thalamus, previously seen on MRI of the brain of 5/9/2010  - Neuro consulted: should rule out stroke. He likely also has peripheral neuropathy both nutritional neuropathy as well as nerve damage caused by etoh.  - Neuro Recommendations: MR brain noncontrast. MR cervical noncon. Obtain thiamine level, A1C, HIV, Ammonia, B6 level, UTox. After level is drawn, Increase thiamine from 100mg qd to 500mg TID x 3-5days  - PT consult  - F/u blood work    # Alcohol abuse  - Ciwa taper  - STart Thiamine and folate  - Educated on risks of alcohol abuse    #Insulin dependant diabetes  - A1c 11%  - Not taking insulin   - Refusing to take in hospital, educated on risks    **patient wants to leave AMA, understands all the risks and dangers of doing so and still wants to proceed**

## 2023-06-02 NOTE — PROGRESS NOTE ADULT - SUBJECTIVE AND OBJECTIVE BOX
NIRAJ ORTEZ  62y  [facility] [id number]      Patient is a 62y old  Male who presents with a chief complaint of Alcohol abuse (01 Jun 2023 21:38)      INTERVAL HPI/OVERNIGHT EVENTS:    no acute events overnight     REVIEW OF SYSTEMS:  CONSTITUTIONAL: No fever, weight loss, or fatigue  EYES: No eye pain, visual disturbances, or discharge  ENMT:  No difficulty hearing, tinnitus, vertigo; No sinus or throat pain  NECK: No pain or stiffness  BREASTS: No pain, masses, or nipple discharge  RESPIRATORY: No cough, wheezing, chills or hemoptysis; No shortness of breath  CARDIOVASCULAR: No chest pain, palpitations, dizziness, or leg swelling  GASTROINTESTINAL: No abdominal or epigastric pain. No nausea, vomiting, or hematemesis; No diarrhea or constipation. No melena or hematochezia.  GENITOURINARY: No dysuria, frequency, hematuria, or incontinence  NEUROLOGICAL: No headaches, memory loss, loss of strength, numbness, or tremors  SKIN: No itching, burning, rashes, or lesions   LYMPH NODES: No enlarged glands  ENDOCRINE: No heat or cold intolerance; No hair loss  MUSCULOSKELETAL: No joint pain or swelling; No muscle, back, or extremity pain  PSYCHIATRIC: No depression, anxiety, mood swings, or difficulty sleeping  HEME/LYMPH: No easy bruising, or bleeding gums  ALLERY AND IMMUNOLOGIC: No hives or eczema  FAMILY HISTORY:    T(C): 36.6 (06-02-23 @ 07:45), Max: 37.6 (06-01-23 @ 16:00)  HR: 86 (06-02-23 @ 07:45) (86 - 93)  BP: 146/84 (06-02-23 @ 07:45) (146/84 - 197/93)  RR: 18 (06-02-23 @ 07:45) (18 - 18)  SpO2: 94% (06-01-23 @ 21:30) (94% - 97%)  Wt(kg): --Vital Signs Last 24 Hrs  T(C): 36.6 (02 Jun 2023 07:45), Max: 37.6 (01 Jun 2023 16:00)  T(F): 97.9 (02 Jun 2023 07:45), Max: 99.6 (01 Jun 2023 16:00)  HR: 86 (02 Jun 2023 07:45) (86 - 93)  BP: 146/84 (02 Jun 2023 07:45) (146/84 - 197/93)  BP(mean): --  RR: 18 (02 Jun 2023 07:45) (18 - 18)  SpO2: 94% (01 Jun 2023 21:30) (94% - 97%)    Parameters below as of 01 Jun 2023 21:30  Patient On (Oxygen Delivery Method): room air        PHYSICAL EXAM:  GENERAL: NAD , while walking noted to be holding on to wall, romberg negative   HEAD:  Atraumatic, Normocephalic  EYES: EOMI, PERRLA, conjunctiva and sclera clear  ENMT: No tonsillar erythema, exudates, or enlargement; Moist mucous membranes, Good dentition, No lesions  NECK: Supple, No JVD, Normal thyroid  NERVOUS SYSTEM:  Alert & Oriented X3, Good concentration; Motor Strength 5/5 B/L upper and lower extremities; DTRs 2+ intact and symmetric  PULM: Clear to auscultation bilaterally  CARDIAC: Regular rate and rhythm; No murmurs, rubs, or gallops  GI: Soft, Nontender, Nondistended; Bowel sounds present  EXTREMITIES:  2+ Peripheral Pulses, No clubbing, cyanosis, or edema  LYMPH: No lymphadenopathy noted  SKIN: No rashes or lesions    Consultant(s) Notes Reviewed:  [x ] YES  [ ] NO  Care Discussed with Consultants/Other Providers [ x] YES  [ ] NO    LABS:                            13.4   6.58  )-----------( 200      ( 01 Jun 2023 22:21 )             39.0   06-01    131<L>  |  94<L>  |  12  ----------------------------<  253<H>  4.5   |  24  |  0.9    Ca    9.1      01 Jun 2023 10:11  Mg     2.3     06-01    TPro  6.8  /  Alb  3.7  /  TBili  0.7  /  DBili  x   /  AST  35  /  ALT  23  /  AlkPhos  89  06-01            acetaminophen     Tablet .. 650 milliGRAM(s) Oral every 6 hours PRN  aluminum hydroxide/magnesium hydroxide/simethicone Suspension 30 milliLiter(s) Oral every 4 hours PRN  cyanocobalamin 1000 MICROGram(s) Oral daily  dextrose 5%. 1000 milliLiter(s) IV Continuous <Continuous>  dextrose 5%. 1000 milliLiter(s) IV Continuous <Continuous>  dextrose 50% Injectable 12.5 Gram(s) IV Push once  dextrose 50% Injectable 25 Gram(s) IV Push once  dextrose 50% Injectable 25 Gram(s) IV Push once  dextrose Oral Gel 15 Gram(s) Oral once PRN  folic acid 1 milliGRAM(s) Oral daily  glucagon  Injectable 1 milliGRAM(s) IntraMuscular once  heparin   Injectable 5000 Unit(s) SubCutaneous every 12 hours  insulin glargine Injectable (LANTUS) 10 Unit(s) SubCutaneous every morning  insulin lispro (ADMELOG) corrective regimen sliding scale   SubCutaneous at bedtime  insulin lispro Injectable (ADMELOG) 3 Unit(s) SubCutaneous three times a day before meals  LORazepam     Tablet   Oral   LORazepam     Tablet 2 milliGRAM(s) Oral every 4 hours  LORazepam   Injectable 2 milliGRAM(s) IV Push every 1 hour PRN  melatonin 3 milliGRAM(s) Oral at bedtime PRN  ondansetron Injectable 4 milliGRAM(s) IV Push every 8 hours PRN  thiamine 100 milliGRAM(s) Oral daily  thiamine IVPB 500 milliGRAM(s) IV Intermittent every 8 hours      HEALTH ISSUES - PROBLEM Dx:          Case Discussed with House Staff   Spectra [phone]

## 2023-06-02 NOTE — PROGRESS NOTE ADULT - SUBJECTIVE AND OBJECTIVE BOX
NIRAJ ORTEZ 62y Male  MRN#: [id number]   Hospital Day: 1d    SUBJECTIVE  Patient is a 62y old Male who presents with a chief complaint of Alcohol abuse (02 Jun 2023 11:12)  Currently admitted to medicine with the primary diagnosis of Ataxia      INTERVAL HPI AND OVERNIGHT EVENTS:  Patient was examined and seen at bedside. This morning he is resting comfortably in bed and reports no issues or overnight events.    OBJECTIVE  PAST MEDICAL & SURGICAL HISTORY  Diabetes mellitus      ALLERGIES:  No Known Allergies    MEDICATIONS:  STANDING MEDICATIONS  cyanocobalamin 1000 MICROGram(s) Oral daily  dextrose 5%. 1000 milliLiter(s) IV Continuous <Continuous>  dextrose 5%. 1000 milliLiter(s) IV Continuous <Continuous>  dextrose 50% Injectable 25 Gram(s) IV Push once  dextrose 50% Injectable 12.5 Gram(s) IV Push once  dextrose 50% Injectable 25 Gram(s) IV Push once  folic acid 1 milliGRAM(s) Oral daily  glucagon  Injectable 1 milliGRAM(s) IntraMuscular once  heparin   Injectable 5000 Unit(s) SubCutaneous every 12 hours  insulin glargine Injectable (LANTUS) 10 Unit(s) SubCutaneous every morning  insulin lispro (ADMELOG) corrective regimen sliding scale   SubCutaneous at bedtime  insulin lispro Injectable (ADMELOG) 3 Unit(s) SubCutaneous three times a day before meals  LORazepam     Tablet 2 milliGRAM(s) Oral every 4 hours  LORazepam     Tablet   Oral   thiamine IVPB 500 milliGRAM(s) IV Intermittent every 8 hours    PRN MEDICATIONS  acetaminophen     Tablet .. 650 milliGRAM(s) Oral every 6 hours PRN  aluminum hydroxide/magnesium hydroxide/simethicone Suspension 30 milliLiter(s) Oral every 4 hours PRN  dextrose Oral Gel 15 Gram(s) Oral once PRN  melatonin 3 milliGRAM(s) Oral at bedtime PRN  ondansetron Injectable 4 milliGRAM(s) IV Push every 8 hours PRN      VITAL SIGNS: Last 24 Hours  T(C): 36.6 (02 Jun 2023 07:45), Max: 37.6 (01 Jun 2023 16:00)  T(F): 97.9 (02 Jun 2023 07:45), Max: 99.6 (01 Jun 2023 16:00)  HR: 86 (02 Jun 2023 07:45) (86 - 93)  BP: 146/84 (02 Jun 2023 07:45) (146/84 - 197/93)  BP(mean): --  RR: 18 (02 Jun 2023 07:45) (18 - 18)  SpO2: 94% (01 Jun 2023 21:30) (94% - 97%)    LABS:                        13.4   6.58  )-----------( 200      ( 01 Jun 2023 22:21 )             39.0     06-01    131<L>  |  94<L>  |  12  ----------------------------<  253<H>  4.5   |  24  |  0.9    Ca    9.1      01 Jun 2023 10:11  Mg     2.3     06-01    TPro  6.8  /  Alb  3.7  /  TBili  0.7  /  DBili  x   /  AST  35  /  ALT  23  /  AlkPhos  89  06-01          Troponin T, Serum: 0.01 ng/mL (06-01-23 @ 17:23)      CARDIAC MARKERS ( 01 Jun 2023 17:23 )  x     / 0.01 ng/mL / x     / x     / x      CARDIAC MARKERS ( 01 Jun 2023 10:11 )  x     / 0.02 ng/mL / x     / x     / x          RADIOLOGY:      PHYSICAL EXAM:  CONSTITUTIONAL: No acute distress, AAOx3  PULMONARY: decreased air entry bilaterally; scattered wheezes  CARDIOVASCULAR: Regular rate and rhythm; no audible murmurs  GASTROINTESTINAL: Soft, non-tender, non-distended; bowel sounds present  MUSCULOSKELETAL: 2+ peripheral pulses; no LLE  NEUROLOGY: left upper limb weakness 4/5 proximal and distal  SKIN: No rashes or lesions; warm and dry

## 2023-06-12 DIAGNOSIS — Z91.A4 CAREGIVER'S OTHER NONCOMPLIANCE WITH PATIENT'S MEDICATION REGIMEN: ICD-10-CM

## 2023-06-12 DIAGNOSIS — Z87.891 PERSONAL HISTORY OF NICOTINE DEPENDENCE: ICD-10-CM

## 2023-06-12 DIAGNOSIS — F10.220 ALCOHOL DEPENDENCE WITH INTOXICATION, UNCOMPLICATED: ICD-10-CM

## 2023-06-12 DIAGNOSIS — G62.1 ALCOHOLIC POLYNEUROPATHY: ICD-10-CM

## 2023-06-12 DIAGNOSIS — F12.90 CANNABIS USE, UNSPECIFIED, UNCOMPLICATED: ICD-10-CM

## 2023-06-12 DIAGNOSIS — E87.1 HYPO-OSMOLALITY AND HYPONATREMIA: ICD-10-CM

## 2023-06-12 DIAGNOSIS — Z53.29 PROCEDURE AND TREATMENT NOT CARRIED OUT BECAUSE OF PATIENT'S DECISION FOR OTHER REASONS: ICD-10-CM

## 2023-06-12 DIAGNOSIS — G31.9 DEGENERATIVE DISEASE OF NERVOUS SYSTEM, UNSPECIFIED: ICD-10-CM
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
## 2023-06-12 DIAGNOSIS — R26.81 UNSTEADINESS ON FEET: ICD-10-CM

## 2023-06-12 DIAGNOSIS — Z91.148 PATIENT'S OTHER NONCOMPLIANCE WITH MEDICATION REGIMEN FOR OTHER REASON: ICD-10-CM

## 2023-06-12 DIAGNOSIS — Z71.41 ALCOHOL ABUSE COUNSELING AND SURVEILLANCE OF ALCOHOLIC: ICD-10-CM

## 2023-06-12 DIAGNOSIS — E11.42 TYPE 2 DIABETES MELLITUS WITH DIABETIC POLYNEUROPATHY: ICD-10-CM

## 2023-06-12 DIAGNOSIS — Z91.128 PATIENT'S INTENTIONAL UNDERDOSING OF MEDICATION REGIMEN FOR OTHER REASON: ICD-10-CM

## 2023-06-12 DIAGNOSIS — R27.8 OTHER LACK OF COORDINATION: ICD-10-CM

## 2023-06-12 DIAGNOSIS — E11.65 TYPE 2 DIABETES MELLITUS WITH HYPERGLYCEMIA: ICD-10-CM

## 2023-06-12 DIAGNOSIS — R29.898 OTHER SYMPTOMS AND SIGNS INVOLVING THE MUSCULOSKELETAL SYSTEM: ICD-10-CM

## 2023-06-12 DIAGNOSIS — T38.3X6A UNDERDOSING OF INSULIN AND ORAL HYPOGLYCEMIC [ANTIDIABETIC] DRUGS, INITIAL ENCOUNTER: ICD-10-CM
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
## 2024-01-24 NOTE — ED ADULT NURSE NOTE - CHIEF COMPLAINT QUOTE
Detail Level: Detailed
Detail Level: Simple
c/o weakness x 1 day, +drinks daily, unsteady gate x few days Hx substance abuse